# Patient Record
Sex: MALE | Race: BLACK OR AFRICAN AMERICAN | NOT HISPANIC OR LATINO | URBAN - METROPOLITAN AREA
[De-identification: names, ages, dates, MRNs, and addresses within clinical notes are randomized per-mention and may not be internally consistent; named-entity substitution may affect disease eponyms.]

---

## 2023-02-06 ENCOUNTER — WEB ENCOUNTER (OUTPATIENT)
Dept: URBAN - METROPOLITAN AREA CLINIC 109 | Facility: CLINIC | Age: 58
End: 2023-02-06

## 2023-02-06 ENCOUNTER — OFFICE VISIT (OUTPATIENT)
Dept: URBAN - METROPOLITAN AREA CLINIC 109 | Facility: CLINIC | Age: 58
End: 2023-02-06
Payer: COMMERCIAL

## 2023-02-06 ENCOUNTER — CLAIMS CREATED FROM THE CLAIM WINDOW (OUTPATIENT)
Dept: URBAN - METROPOLITAN AREA CLINIC 109 | Facility: CLINIC | Age: 58
End: 2023-02-06

## 2023-02-06 ENCOUNTER — DASHBOARD ENCOUNTERS (OUTPATIENT)
Age: 58
End: 2023-02-06

## 2023-02-06 VITALS
DIASTOLIC BLOOD PRESSURE: 80 MMHG | WEIGHT: 182.6 LBS | SYSTOLIC BLOOD PRESSURE: 136 MMHG | HEART RATE: 97 BPM | BODY MASS INDEX: 24.73 KG/M2 | HEIGHT: 72 IN | TEMPERATURE: 97.9 F

## 2023-02-06 DIAGNOSIS — F31.9 BIPOLAR AFFECTIVE DISORDER, REMISSION STATUS UNSPECIFIED: ICD-10-CM

## 2023-02-06 DIAGNOSIS — D64.89 ANEMIA DUE TO OTHER CAUSE: ICD-10-CM

## 2023-02-06 DIAGNOSIS — F20.89 OTHER SCHIZOPHRENIA: ICD-10-CM

## 2023-02-06 DIAGNOSIS — R10.13 EPIGASTRIC PAIN: ICD-10-CM

## 2023-02-06 DIAGNOSIS — Z86.010 PERSONAL HISTORY OF COLONIC POLYPS: ICD-10-CM

## 2023-02-06 PROBLEM — 58214004: Status: ACTIVE | Noted: 2023-02-06

## 2023-02-06 PROBLEM — 13746004: Status: ACTIVE | Noted: 2023-02-06

## 2023-02-06 PROBLEM — 428283002: Status: ACTIVE | Noted: 2023-02-06

## 2023-02-06 PROCEDURE — 99244 OFF/OP CNSLTJ NEW/EST MOD 40: CPT

## 2023-02-06 PROCEDURE — 99204 OFFICE O/P NEW MOD 45 MIN: CPT

## 2023-02-06 RX ORDER — PANTOPRAZOLE SODIUM 40 MG/1
1 TABLET TABLET, DELAYED RELEASE ORAL ONCE A DAY
Qty: 30 | Refills: 4 | OUTPATIENT
Start: 2023-02-06

## 2023-02-06 NOTE — HPI-TODAY'S VISIT:
Mr. Bryan is a 58 y/o M who was referred to us by Dr. Pappas for anemia of unknown cause and epigasgric pain. A copy of this OV will be sent to referring provider. Hemoglobin on referral: 12.2.   Pt is accompanied by his sister who assists w/ hx of pt is bipolar and schizophrenic. She reports that they are here for anemia. Pt denies any overt GI bleeding, SOB, weakness or fatigue. Denies previous EGD, last colonoscopy listed below. Reports he began having epigastric pain yesterday. Sister states he has been eating many sweets of the weekend. Pt states it feels like he is going to vomit.   Last colonoscopy in 2019 revealed x1 diminutive polyp in the sigmoid, non-bleeding internal hemorrhoids, exam otherwise normal. Pathology TA with x5 yr recall.

## 2023-02-06 NOTE — PHYSICAL EXAM CONSTITUTIONAL:
well developed, well nourished , in no acute distress , ambulating without difficulty , abnormal communication ability

## 2023-02-07 LAB
A/G RATIO: 1.2
ABSOLUTE BASOPHILS: 12
ABSOLUTE EOSINOPHILS: 24
ABSOLUTE LYMPHOCYTES: 1131
ABSOLUTE MONOCYTES: 952
ABSOLUTE NEUTROPHILS: 9782
ALBUMIN: 3.9
ALKALINE PHOSPHATASE: 85
ALT (SGPT): 81
AST (SGOT): 46
BASOPHILS: 0.1
BILIRUBIN, TOTAL: 0.5
BUN/CREATININE RATIO: (no result)
BUN: 11
CALCIUM: 8.7
CARBON DIOXIDE, TOTAL: 24
CHLORIDE: 104
CREATININE: 0.95
EGFR: 93
EOSINOPHILS: 0.2
FERRITIN, SERUM: 83
GLOBULIN, TOTAL: 3.2
GLUCOSE: 101
HEMATOCRIT: 33
HEMOGLOBIN: 11.1
IRON BIND.CAP.(TIBC): 307
IRON SATURATION: 12
IRON: 38
LYMPHOCYTES: 9.5
MCH: 30.5
MCHC: 33.6
MCV: 90.7
MONOCYTES: 8
MPV: 11.1
NEUTROPHILS: 82.2
PLATELET COUNT: 179
POTASSIUM: 3.9
PROTEIN, TOTAL: 7.1
RDW: 12.6
RED BLOOD CELL COUNT: 3.64
SODIUM: 139
WHITE BLOOD CELL COUNT: 11.9

## 2023-02-13 ENCOUNTER — TELEPHONE ENCOUNTER (OUTPATIENT)
Dept: URBAN - METROPOLITAN AREA CLINIC 109 | Facility: CLINIC | Age: 58
End: 2023-02-13

## 2023-02-13 PROBLEM — 87522002: Status: ACTIVE | Noted: 2023-02-13

## 2023-02-13 RX ORDER — POLYETHYLENE GLYCOL 3350, SODIUM SULFATE ANHYDROUS, SODIUM BICARBONATE, SODIUM CHLORIDE, POTASSIUM CHLORIDE 236; 22.74; 6.74; 5.86; 2.97 G/4L; G/4L; G/4L; G/4L; G/4L
AS DIRECTED POWDER, FOR SOLUTION ORAL 1
Qty: 1 | Refills: 0 | OUTPATIENT
Start: 2023-02-23 | End: 2023-02-24

## 2023-02-13 RX ORDER — PANTOPRAZOLE SODIUM 40 MG/1
1 TABLET TABLET, DELAYED RELEASE ORAL ONCE A DAY
Qty: 30 | Refills: 4 | Status: ACTIVE | COMMUNITY
Start: 2023-02-06

## 2023-02-23 ENCOUNTER — LAB OUTSIDE AN ENCOUNTER (OUTPATIENT)
Dept: URBAN - METROPOLITAN AREA CLINIC 109 | Facility: CLINIC | Age: 58
End: 2023-02-23

## 2023-03-03 ENCOUNTER — OFFICE VISIT (OUTPATIENT)
Dept: URBAN - METROPOLITAN AREA CLINIC 108 | Facility: CLINIC | Age: 58
End: 2023-03-03
Payer: COMMERCIAL

## 2023-03-03 DIAGNOSIS — D50.8 OTHER IRON DEFICIENCY ANEMIA: ICD-10-CM

## 2023-03-03 PROCEDURE — 82274 ASSAY TEST FOR BLOOD FECAL: CPT | Performed by: INTERNAL MEDICINE

## 2023-03-13 PROBLEM — 87522002 IRON DEFICIENCY ANEMIA: Status: ACTIVE | Noted: 2023-03-13

## 2023-05-01 ENCOUNTER — OFFICE VISIT (OUTPATIENT)
Dept: URBAN - METROPOLITAN AREA CLINIC 109 | Facility: CLINIC | Age: 58
End: 2023-05-01

## 2024-10-24 ENCOUNTER — LAB OUTSIDE AN ENCOUNTER (OUTPATIENT)
Dept: URBAN - METROPOLITAN AREA CLINIC 84 | Facility: CLINIC | Age: 59
End: 2024-10-24

## 2024-10-24 ENCOUNTER — OFFICE VISIT (OUTPATIENT)
Dept: URBAN - METROPOLITAN AREA CLINIC 84 | Facility: CLINIC | Age: 59
End: 2024-10-24
Payer: COMMERCIAL

## 2024-10-24 VITALS
HEIGHT: 72 IN | WEIGHT: 174.2 LBS | SYSTOLIC BLOOD PRESSURE: 86 MMHG | BODY MASS INDEX: 23.6 KG/M2 | TEMPERATURE: 98.4 F | DIASTOLIC BLOOD PRESSURE: 65 MMHG | HEART RATE: 114 BPM

## 2024-10-24 DIAGNOSIS — R63.0 ANOREXIA: ICD-10-CM

## 2024-10-24 DIAGNOSIS — Z86.0100 HISTORY OF COLONIC POLYPS: ICD-10-CM

## 2024-10-24 DIAGNOSIS — D50.9 IRON DEFICIENCY ANEMIA, UNSPECIFIED IRON DEFICIENCY ANEMIA TYPE: ICD-10-CM

## 2024-10-24 DIAGNOSIS — R63.4 WEIGHT LOSS: ICD-10-CM

## 2024-10-24 PROBLEM — 79890006: Status: ACTIVE | Noted: 2024-10-24

## 2024-10-24 PROCEDURE — 99214 OFFICE O/P EST MOD 30 MIN: CPT | Performed by: INTERNAL MEDICINE

## 2024-10-24 RX ORDER — PANTOPRAZOLE SODIUM 40 MG/1
1 TABLET TABLET, DELAYED RELEASE ORAL ONCE A DAY
Qty: 30 | Refills: 4 | Status: ACTIVE | COMMUNITY
Start: 2023-02-06

## 2024-11-14 ENCOUNTER — CLAIMS CREATED FROM THE CLAIM WINDOW (OUTPATIENT)
Dept: URBAN - METROPOLITAN AREA SURGERY CENTER 20 | Facility: SURGERY CENTER | Age: 59
End: 2024-11-14
Payer: COMMERCIAL

## 2024-11-14 DIAGNOSIS — K64.8 INTERNAL HEMORRHOIDS: ICD-10-CM

## 2024-11-14 DIAGNOSIS — R63.4 WEIGHT LOSS: ICD-10-CM

## 2024-11-14 DIAGNOSIS — D50.9 ANEMIA: ICD-10-CM

## 2024-11-14 DIAGNOSIS — D50.9 IRON DEFICIENCY ANEMIA, UNSPECIFIED IRON DEFICIENCY ANEMIA TYPE: ICD-10-CM

## 2024-11-14 PROCEDURE — 00811 ANES LWR INTST NDSC NOS: CPT | Performed by: NURSE ANESTHETIST, CERTIFIED REGISTERED

## 2024-11-14 PROCEDURE — 45378 DIAGNOSTIC COLONOSCOPY: CPT | Performed by: INTERNAL MEDICINE

## 2024-11-14 RX ORDER — PANTOPRAZOLE SODIUM 40 MG/1
1 TABLET TABLET, DELAYED RELEASE ORAL ONCE A DAY
Qty: 30 | Refills: 4 | Status: ACTIVE | COMMUNITY
Start: 2023-02-06